# Patient Record
(demographics unavailable — no encounter records)

---

## 2025-04-07 NOTE — DISCUSSION/SUMMARY
[FreeTextEntry1] : 67-year-old -0-1-4 presents with postmenopausal bleeding.  Denies pain discharge or itching.  Physical examination shows atrophic changes and normal size uterus and clear fluid per vagina.  Need for endometrial sampling discussed at length.  Pelvic sonogram showed 1.3 cm thickened endometrium with some fibroids.  Diet and exercise reviewed.  All questions answered.  Patient to return in 2 weeks for endometrial sampling hysteroscopy. Addendum--patient prefers D&C hysteroscopy at the ambulatory center and will be scheduled.

## 2025-04-07 NOTE — HISTORY OF PRESENT ILLNESS
[N] : Patient does not use contraception [Y] : Positive pregnancy history [Menarche Age: ____] : age at menarche was [unfilled] [Menopause Age: ____] : age at menopause was [unfilled] [FreeTextEntry1] : 67-year-old -0-1-4 presents to the office with vaginal spotting since 2025.  She gives history of fibroid uterus.  She denies pain or discharge or itching.  She denies stress incontinence.  Sonogram of 2025 showed uterus in anteverted form measuring 8.8 x 5.0 x 6.4 cm.  Uterine fibroids identified measuring 3.5 x 2.7 x 2.1 cm posteriorly located intramural/subserosal fibroid in the lower uterine segment.  Second fibroid measuring 1.7 x 1.7 x 1.2 cm posteriorly located intramural submucosal fibroid in the lower uterine segment.  The third 2.0 x 2.2 x 1.7 cm anteriorly located intramural fundal fibroid.  The fourth 1 is 1.7 x 2.1 x 1.4 cm posteriorly located intramural fundal fibroid.  Heterogeneous appearing endometrium with area of cystic changes measuring 1.3 cm in maximum thickness.  The right ovary measures 2.3 x 1.2 x 2.3 cm normal in morphology the left ovary was not visualized despite a concentrated effort by the technician.  No free fluid noted. [Mammogramdate] : 03/25 [BreastSonogramDate] : 03/25 [PapSmeardate] : 03/25 [PGHxTotal] : 5 [[facility]] : 4 [PGHxPremature] : 0 [PGHxAbortions] : 1 [[facility]] : 4 [PGHxABInduced] : 0 [PGHxABSpont] : 1 [PGHxEctopic] : 0 [PGHxMultBirths] : 0

## 2025-04-07 NOTE — PHYSICAL EXAM
[Chaperoned Physical Exam] : A chaperone was present in the examining room during all aspects of the physical examination. [MA] : MA [Appropriately responsive] : appropriately responsive [Alert] : alert [No Acute Distress] : no acute distress [No Lymphadenopathy] : no lymphadenopathy [Regular Rate Rhythm] : regular rate rhythm [No Murmurs] : no murmurs [Clear to Auscultation B/L] : clear to auscultation bilaterally [Soft] : soft [Non-tender] : non-tender [Non-distended] : non-distended [No HSM] : No HSM [No Lesions] : no lesions [No Mass] : no mass [Oriented x3] : oriented x3 [Examination Of The Breasts] : a normal appearance [No Masses] : no breast masses were palpable [Vulvar Atrophy] : vulvar atrophy [Labia Majora] : normal [Labia Minora] : normal [Atrophy] : atrophy [Normal] : normal [Uterine Adnexae] : normal [Declined] : Patient declined rectal exam [FreeTextEntry2] : ramone

## 2025-05-01 NOTE — HISTORY OF PRESENT ILLNESS
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  Patient is being sent to GYN oncology for evaluation and treatment.

## 2025-05-01 NOTE — PLAN
[FreeTextEntry1] : 67-year-old with newly diagnosed endometrioid carcinoma being referred to GYN oncology for evaluation and treatment.  Return in 4 weeks for follow-up.

## 2025-05-09 NOTE — CHIEF COMPLAINT
[FreeTextEntry1] : [facility] Physician [facility] Gynecology Oncology [place] [address] [place] [address]  Endometrial cancer

## 2025-05-09 NOTE — HISTORY OF PRESENT ILLNESS
[FreeTextEntry1] : 68 yo  LMP10 years ago Referred by Dr Gilbert for endometrial cancer.   Patient reports starting in 2025 she noticed occasional smears of blood in her undergarments. She presented to GYN who performed US and subsequently a D&C. She admits to KAYLA which is bothersome to her. She denies pelvic pain, sexual activity, unintentional weight loss, change in bowel/bladder habits.   US 4/3/25 Uterus AV measuring 8.8. x 5.0 x 6.4 cm, with uterine fibroids, heterogeneous appearing thickened endometrial lining with areas of cystic change measuring 1.3 cm, differential includes endometrial hyperplasia/neoplasia, gynecological consultation and tissue sampling is recommended. Grossly unremarkable appearance of right ovary, left ovary not visualized due to excessive bowel gas in the adnexa.   Pt s/p EUA Fractional D&C with hysteroscopic resection of intrauterine polypoid masses at [facility] 25 pathology as follows;  1. Endocervix, curettings/biopsy  - Minute cluster of atypical glandular cells - Endocervical tissue and superficial squamous cells with reactive change  2. Endometrium, curettings/biopsy - Endometrioid carcinoma, FIGO grade 1  3. Endometrial polyp or mass, biopsy - Endometrioid carcinoma, FIGO grade 1   Note: Immunohistochemical stains show the tumor cells have wild type p53 expression, p16 shows patchy staining and ER is positive in tumor cells.  MMR (by IHC)- reporting Template (CAP 2013)  Immunohistochemistry Testing (IHC) for Mismatch Repair Proteins performed on tissue block 2A shows the following result:  MLH1 : Loss of nuclear expression MSH2 :   Intact nuclear expression MSH6:   Intact nuclear expression PMS2 : Loss of nuclear expression  Lpap:  WNL per pt Lmammo:  WNL per pt Lcolonoscopy: 5 years ago due now LDEXA: 2019 WNL per pt

## 2025-05-09 NOTE — DISCUSSION/SUMMARY
[FreeTextEntry1] : Discussed standard of care of endometrial cancer including RA TLH BSO SLND Cystoscopy. This disease is 99% curable and 75% curable with surgery alone, occasionally (15%) pt may require RT and <5% of the time will require chemotherapy. This will be determined at time of her final pathology review from hysterectomy. She may notice hot flushing once her ovaries are removed but there is no health risk associated with removal of bilateral ovaries after age 65.   I discussed at length with the patient the nature, purpose, risks, benefits, and alternatives of Robot assisted total laparoscopic hysterectomy with bilateral salpingo-oophorectomy,sentinel lymph node mapping.  The patient understands the risks to include, but not be limited to, bowel injury, bleeding (with the possible need for transfusion), bladder or ureteral injury, infections, deep venous thrombosis, and moises-operative death. The patient also understands that her surgery may not be able to be performed robotically and that she may need a laparotomy.  She also understands the limitations of robotic surgery and the possibility of missing a surgical complication with need for subsequent re-exploration.  She agrees to proceed.  She asked numerous questions which were answered to her satisfaction.  She understands the need for a pre-operative bowel preparation and agrees to comply with our instructions.  She also understands the rationale for a cystoscopy at the completion of the procedure and the potential risks of cystoscopy.

## 2025-05-09 NOTE — PHYSICAL EXAM
[Chaperoned Physical Exam] : A chaperone was present in the examining room during all aspects of the physical examination. [PA] : PA [Normal] : Mood and affect: Normal [FreeTextEntry2] : Maria Elena Lozoya [de-identified] : GYN DEFERRED

## 2025-05-09 NOTE — CHIEF COMPLAINT
[FreeTextEntry1] : [facility] Physician [facility] Gynecology Oncology [place] [address] [place] [address]  Endometrial cancer

## 2025-05-09 NOTE — CHIEF COMPLAINT
[FreeTextEntry1] : [facility] Physician [facility] Gynecology Oncology [place] [address] [place] [address]  Endometrial cancer

## 2025-05-09 NOTE — PHYSICAL EXAM
[Chaperoned Physical Exam] : A chaperone was present in the examining room during all aspects of the physical examination. [PA] : PA [Normal] : Mood and affect: Normal [FreeTextEntry2] : Maria Elena Lozoya [de-identified] : GYN DEFERRED

## 2025-05-09 NOTE — PHYSICAL EXAM
[Chaperoned Physical Exam] : A chaperone was present in the examining room during all aspects of the physical examination. [PA] : PA [Normal] : Mood and affect: Normal [FreeTextEntry2] : Maria Elena Lozoya [de-identified] : GYN DEFERRED

## 2025-05-09 NOTE — HISTORY OF PRESENT ILLNESS
[FreeTextEntry1] : 66 yo  LMP10 years ago Referred by Dr Gilbert for endometrial cancer.   Patient reports starting in 2025 she noticed occasional smears of blood in her undergarments. She presented to GYN who performed US and subsequently a D&C. She admits to KAYLA which is bothersome to her. She denies pelvic pain, sexual activity, unintentional weight loss, change in bowel/bladder habits.   US 4/3/25 Uterus AV measuring 8.8. x 5.0 x 6.4 cm, with uterine fibroids, heterogeneous appearing thickened endometrial lining with areas of cystic change measuring 1.3 cm, differential includes endometrial hyperplasia/neoplasia, gynecological consultation and tissue sampling is recommended. Grossly unremarkable appearance of right ovary, left ovary not visualized due to excessive bowel gas in the adnexa.   Pt s/p EUA Fractional D&C with hysteroscopic resection of intrauterine polypoid masses at [facility] 25 pathology as follows;  1. Endocervix, curettings/biopsy  - Minute cluster of atypical glandular cells - Endocervical tissue and superficial squamous cells with reactive change  2. Endometrium, curettings/biopsy - Endometrioid carcinoma, FIGO grade 1  3. Endometrial polyp or mass, biopsy - Endometrioid carcinoma, FIGO grade 1   Note: Immunohistochemical stains show the tumor cells have wild type p53 expression, p16 shows patchy staining and ER is positive in tumor cells.  MMR (by IHC)- reporting Template (CAP 2013)  Immunohistochemistry Testing (IHC) for Mismatch Repair Proteins performed on tissue block 2A shows the following result:  MLH1 : Loss of nuclear expression MSH2 :   Intact nuclear expression MSH6:   Intact nuclear expression PMS2 : Loss of nuclear expression  Lpap:  WNL per pt Lmammo:  WNL per pt Lcolonoscopy: 5 years ago due now LDEXA: 2019 WNL per pt

## 2025-05-09 NOTE — PLAN
[TextEntry] : FU Methylation from D&C pathology EUA RA TLH BSO SLND Cystoscopy @ [facility] Consent signed in office Acknowledgement of hysterectomy signed Slide release signed Medical clearance and PST with CBC, CMP, HgA1c, PTT/PT/INR, UA, T&S. Bowel prep/ERP discussed

## 2025-05-09 NOTE — END OF VISIT
[FreeTextEntry3] : I, Dr. Farhan Lobo, personally performed the evaluation and management of (E/M) services for this new patient. That E/M includes conducting the initial examination, assessing all conditions, and establishing the plan of care. Today, Maria Elena Lozoya PA-C, was here to observe my evaluation and management services for this patient to be followed going forward.

## 2025-05-09 NOTE — PLAN
[TextEntry] : FU Methylation from D&C pathology EUA RA TLH BSO SLND Cystoscopy @ [facility] Consent signed in office Acknowledgement of hysterectomy signed Slide release signed Medical clearance and PST with CBC, CMP, HgA1c, PTT/PT/INR, UA, T&S. Bowel prep/ERP discussed

## 2025-05-09 NOTE — HISTORY OF PRESENT ILLNESS
[FreeTextEntry1] : 68 yo  LMP10 years ago Referred by Dr Gilbert for endometrial cancer.   Patient reports starting in 2025 she noticed occasional smears of blood in her undergarments. She presented to GYN who performed US and subsequently a D&C. She admits to KAYLA which is bothersome to her. She denies pelvic pain, sexual activity, unintentional weight loss, change in bowel/bladder habits.   US 4/3/25 Uterus AV measuring 8.8. x 5.0 x 6.4 cm, with uterine fibroids, heterogeneous appearing thickened endometrial lining with areas of cystic change measuring 1.3 cm, differential includes endometrial hyperplasia/neoplasia, gynecological consultation and tissue sampling is recommended. Grossly unremarkable appearance of right ovary, left ovary not visualized due to excessive bowel gas in the adnexa.   Pt s/p EUA Fractional D&C with hysteroscopic resection of intrauterine polypoid masses at [facility] 25 pathology as follows;  1. Endocervix, curettings/biopsy  - Minute cluster of atypical glandular cells - Endocervical tissue and superficial squamous cells with reactive change  2. Endometrium, curettings/biopsy - Endometrioid carcinoma, FIGO grade 1  3. Endometrial polyp or mass, biopsy - Endometrioid carcinoma, FIGO grade 1   Note: Immunohistochemical stains show the tumor cells have wild type p53 expression, p16 shows patchy staining and ER is positive in tumor cells.  MMR (by IHC)- reporting Template (CAP 2013)  Immunohistochemistry Testing (IHC) for Mismatch Repair Proteins performed on tissue block 2A shows the following result:  MLH1 : Loss of nuclear expression MSH2 :   Intact nuclear expression MSH6:   Intact nuclear expression PMS2 : Loss of nuclear expression  Lpap:  WNL per pt Lmammo:  WNL per pt Lcolonoscopy: 5 years ago due now LDEXA: 2019 WNL per pt

## 2025-05-09 NOTE — PLAN
[TextEntry] : FU Methylation from D&C pathology EUA RA TLH BSO SLND Cystoscopy @ [facility] Consent signed in office Acknowledgement of hysterectomy signed Slide release signed Medical clearance and PST with CBC, CMP, HgA1c, PTT/PT/INR, UA, T&S. Bowel prep/ERP discussed

## 2025-05-20 NOTE — PROCEDURE
[Straight Catheterization] : insertion of a straight catheter [Stress Incontinence] : stress incontinence [Urgent Incontinence] : urgent incontinence [Urinary Frequency] : urinary frequency [Patient] : the patient [___ Fr Straight Tip] : a [unfilled] in [place] straight tip catheter [None] : there were no complications with the catheter insertion [Clear] : clear [No Complications] : no complications [Tolerated Well] : the patient tolerated the procedure well [Post procedure instructions and information given] : Post procedure instructions and information were given and reviewed with patient. [1] : 1 [FreeTextEntry1] : cathed to obtain pvr and uncontam specimen

## 2025-05-20 NOTE — PHYSICAL EXAM
[MA] : MA [No Acute Distress] : in no acute distress [Oriented x3] : oriented to person, place, and time [None] : no CVA tenderness [Labia Majora] : were normal [Labia Minora] : were normal [Bartholin's Gland] : both Bartholin's glands were normal  [Normal Appearance] : general appearance was normal [No Bleeding] : there was no active vaginal bleeding [2] : 2 [Aa ____] : Aa [unfilled] [Ba ____] : Ba [unfilled] [C ____] : C [unfilled] [GH ____] : GH [unfilled] [PB ____] : PB [unfilled] [TVL ____] : TVL  [unfilled] [Ap ____] : Ap [unfilled] [Bp ____] : Bp [unfilled] [D ____] : D [unfilled] [] : I [Normal] : normal [Soft] :  the cervix was soft [Post Void Residual ____ml] : post void residual was [unfilled] ml [Exam Deferred] : was deferred [FreeTextEntry2] : Leda [Tenderness] : ~T no ~M abdominal tenderness observed [Distended] : not distended [FreeTextEntry4] : no mass lesion or cyst [de-identified] : exam limited

## 2025-05-20 NOTE — OB HISTORY
[Vaginal ___] : [unfilled] vaginal delivery(s) [Definite ___ (Date)] : the last menstrual period was [unfilled] [Last Pap Smear ___] : date of last pap smear was on [unfilled] [Abnormal Pap Smear] : normal pap smear [Sexually Active] : is not sexually active [FreeTextEntry1] : largest baby 9 lbs

## 2025-05-20 NOTE — ASSESSMENT
[FreeTextEntry1] : 68 yo  P4 with CHING, urgency incontinence > KAYLA symptomatically. On exam, empty supine CST was neg but full CST was positive. Her PVR was normal. Simple CMG had been performed. POPQ did not reveal clinically significant POP.  The patient has symptoms consistent with stress urinary incontinence. The etiology of KAYLA was discussed. Management options including observation, behavioral modifications, medication, pessary, Impressa insert, periurethral bulking via cystoscopy, and surgery with midurethral sling were reviewed.   The patient has urinary symptoms consistent with overactive bladder. The etiology of OAB was discussed. Management options including observation, behavioral modifications (dietary changes, monitoring fluid intake, bladder training, timed voids, use of pads/protective garments), kegels, PT, medications, PTNS, SNS, and bladder Botox were all reviewed.  MUS intended to treat KAYLA and not OAB reviewed. She is mostly bothered by urgency and UUI and would like to defer MUS at the time of Gyn Onc surg for now. She likely will pursue office OAB management after, likely bladder botox. Weight loss could help as well, which was discussed. She does not have POP that would warrant potential Urogyn surg intervention at the time of Gyn Onc surg. All ques answered.

## 2025-05-20 NOTE — HISTORY OF PRESENT ILLNESS
[FreeTextEntry1] : Patient is scheduled to undergo RA TLH BSO SLND Cystoscopy with Dr. Lobo for endometrial cancer. Leakage of urine is bothersome with sneeze and cough, also with urgency. No dysuria, no UTIs, no incomplete emptying, no gross hematuria. No bulge or pressure from the vagina, no protrusion of tissue. Occasional constipation, no FI.   POB  PMH BMI 36, renal disease, HTN, DM, OA, thyroid disease, endometrial cancer PSH bunionectomy, D&C NKDA Nonsmoker Works as a nurse practitioner  TVUS 2025 8.8. x 5.0 x 6.4 cm, with uterine fibroids, heterogeneous appearing thickened endometrial lining with areas of cystic change measuring 1.3 cm, grossly unremarkable R ov, L ov not visualized. EmBx 2025 endometrioid carcinoma, FIGO grade 1 Pap per patient 2022 WNL Last colonoscopy about 5 years prior

## 2025-05-20 NOTE — PROCEDURE
[Straight Catheterization] : insertion of a straight catheter [Stress Incontinence] : stress incontinence [Urgent Incontinence] : urgent incontinence [Urinary Frequency] : urinary frequency [Patient] : the patient [___ Fr Straight Tip] : a [unfilled] in [place] straight tip catheter [None] : there were no complications with the catheter insertion [Clear] : clear [No Complications] : no complications [Tolerated Well] : the patient tolerated the procedure well [Post procedure instructions and information given] : Post procedure instructions and information were given and reviewed with patient. [1] : 1 [FreeTextEntry1] : cathed to obtain pvr and uncontam specimen

## 2025-05-20 NOTE — PHYSICAL EXAM
[MA] : MA [No Acute Distress] : in no acute distress [Oriented x3] : oriented to person, place, and time [None] : no CVA tenderness [Labia Majora] : were normal [Labia Minora] : were normal [Bartholin's Gland] : both Bartholin's glands were normal  [Normal Appearance] : general appearance was normal [No Bleeding] : there was no active vaginal bleeding [2] : 2 [Aa ____] : Aa [unfilled] [Ba ____] : Ba [unfilled] [C ____] : C [unfilled] [GH ____] : GH [unfilled] [PB ____] : PB [unfilled] [TVL ____] : TVL  [unfilled] [Ap ____] : Ap [unfilled] [Bp ____] : Bp [unfilled] [D ____] : D [unfilled] [] : I [Normal] : normal [Soft] :  the cervix was soft [Post Void Residual ____ml] : post void residual was [unfilled] ml [Exam Deferred] : was deferred [FreeTextEntry2] : Leda [Tenderness] : ~T no ~M abdominal tenderness observed [Distended] : not distended [FreeTextEntry4] : no mass lesion or cyst [de-identified] : exam limited

## 2025-05-20 NOTE — ASSESSMENT
[FreeTextEntry1] : 66 yo  P4 with CHING, urgency incontinence > KAYLA symptomatically. On exam, empty supine CST was neg but full CST was positive. Her PVR was normal. Simple CMG had been performed. POPQ did not reveal clinically significant POP.  The patient has symptoms consistent with stress urinary incontinence. The etiology of KAYLA was discussed. Management options including observation, behavioral modifications, medication, pessary, Impressa insert, periurethral bulking via cystoscopy, and surgery with midurethral sling were reviewed.   The patient has urinary symptoms consistent with overactive bladder. The etiology of OAB was discussed. Management options including observation, behavioral modifications (dietary changes, monitoring fluid intake, bladder training, timed voids, use of pads/protective garments), kegels, PT, medications, PTNS, SNS, and bladder Botox were all reviewed.  MUS intended to treat KAYLA and not OAB reviewed. She is mostly bothered by urgency and UUI and would like to defer MUS at the time of Gyn Onc surg for now. She likely will pursue office OAB management after, likely bladder botox. Weight loss could help as well, which was discussed. She does not have POP that would warrant potential Urogyn surg intervention at the time of Gyn Onc surg. All ques answered.

## 2025-05-20 NOTE — PROCEDURE
[Straight Catheterization] : insertion of a straight catheter [Stress Incontinence] : stress incontinence [Urgent Incontinence] : urgent incontinence [Urinary Frequency] : urinary frequency [Patient] : the patient [___ Fr Straight Tip] : a [unfilled] in [place] straight tip catheter [None] : there were no complications with the catheter insertion [Clear] : clear [No Complications] : no complications [Tolerated Well] : the patient tolerated the procedure well [Post procedure instructions and information given] : Post procedure instructions and information were given and reviewed with patient. [1] : 1 [FreeTextEntry1] : cathed to obtain pvr and uncontam specimen

## 2025-05-20 NOTE — REASON FOR VISIT
[Questionnaire Received] : Patient questionnaire received [Urinary Incontinence] : urinary incontinence [Urine Frequency] : urine frequency [Urinary Urgency] : urinary urgency [Nocturia] : nocturia [FreeTextEntry2] : scheduling surgical staging for endometrial cancer

## 2025-05-20 NOTE — PROCEDURE
[Straight Catheterization] : insertion of a straight catheter [Stress Incontinence] : stress incontinence [Urgent Incontinence] : urgent incontinence [Urinary Frequency] : urinary frequency [Patient] : the patient [___ Fr Straight Tip] : a [unfilled] in [place] straight tip catheter [None] : there were no complications with the catheter insertion [Clear] : clear [No Complications] : no complications [Tolerated Well] : the patient tolerated the procedure well [Post procedure instructions and information given] : Post procedure instructions and information were given and reviewed with patient. [1] : 1 [FreeTextEntry1] : cathed to obtain pvr and uncontam specimen

## 2025-05-20 NOTE — PHYSICAL EXAM
[MA] : MA [No Acute Distress] : in no acute distress [Oriented x3] : oriented to person, place, and time [None] : no CVA tenderness [Labia Majora] : were normal [Labia Minora] : were normal [Bartholin's Gland] : both Bartholin's glands were normal  [Normal Appearance] : general appearance was normal [No Bleeding] : there was no active vaginal bleeding [2] : 2 [Aa ____] : Aa [unfilled] [Ba ____] : Ba [unfilled] [C ____] : C [unfilled] [GH ____] : GH [unfilled] [PB ____] : PB [unfilled] [TVL ____] : TVL  [unfilled] [Ap ____] : Ap [unfilled] [Bp ____] : Bp [unfilled] [D ____] : D [unfilled] [] : I [Normal] : normal [Soft] :  the cervix was soft [Post Void Residual ____ml] : post void residual was [unfilled] ml [Exam Deferred] : was deferred [FreeTextEntry2] : Leda [Tenderness] : ~T no ~M abdominal tenderness observed [Distended] : not distended [FreeTextEntry4] : no mass lesion or cyst [de-identified] : exam limited

## 2025-05-20 NOTE — PHYSICAL EXAM
[MA] : MA [No Acute Distress] : in no acute distress [Oriented x3] : oriented to person, place, and time [None] : no CVA tenderness [Labia Majora] : were normal [Labia Minora] : were normal [Bartholin's Gland] : both Bartholin's glands were normal  [Normal Appearance] : general appearance was normal [No Bleeding] : there was no active vaginal bleeding [2] : 2 [Aa ____] : Aa [unfilled] [Ba ____] : Ba [unfilled] [C ____] : C [unfilled] [GH ____] : GH [unfilled] [PB ____] : PB [unfilled] [TVL ____] : TVL  [unfilled] [Ap ____] : Ap [unfilled] [Bp ____] : Bp [unfilled] [D ____] : D [unfilled] [] : I [Normal] : normal [Soft] :  the cervix was soft [Post Void Residual ____ml] : post void residual was [unfilled] ml [Exam Deferred] : was deferred [FreeTextEntry2] : Leda [Tenderness] : ~T no ~M abdominal tenderness observed [Distended] : not distended [FreeTextEntry4] : no mass lesion or cyst [de-identified] : exam limited

## 2025-05-30 NOTE — HISTORY OF PRESENT ILLNESS
[Home] : at home, [unfilled] , at the time of the visit. [Other Location: e.g. Home (Enter Location, City,State)___] : at [unfilled] [Telephone (audio)] : This telephonic visit was provided via audio only technology. [Verbal consent obtained from patient] : the patient, [unfilled] [FreeTextEntry1] : 66 yo  LMP10 years ago Referred by Dr Gilbert for endometrial cancer with plan for robotic hysterectomy, bilateral salpingo-oophorectomy, sentinel lymph node dissection, cystoscopy. She presents to discuss consultation with urogynecology. She has decided to avoid surgical management for now and focus on other options. She has mixed incontinence and only portion of the problem would be resolved.   Indication: transportation issue

## 2025-05-30 NOTE — END OF VISIT
[Time Spent: ___ minutes] : I have spent [unfilled] minutes of time on the encounter which excludes teaching and separately reported services. [FreeTextEntry3] : Proceed with surgery

## 2025-05-30 NOTE — ASSESSMENT
[FreeTextEntry1] : 68 yo  LMP10 years ago Referred by Dr Gilbert for endometrial cancer with plan for robotic hysterectomy, bilateral salpingo-oophorectomy, sentinel lymph node dissection, cystoscopy. She presents to discuss consultation with urogynecology. She has decided to avoid surgical management for now and focus on other options. She has mixed incontinence and only portion of the problem would be resolved.   She has surgery scheduled for  and we will proceed with surgery without urogynecology.

## 2025-07-02 NOTE — REASON FOR VISIT
[Post Op] : post op visit [de-identified] : 6/11/25 [de-identified] : RA TLH BSO SLND Uterosacral ligament fixation cystoscopy  [de-identified] : Patient has recovered well from her surgery, Denies any SOB, abnormal pain or VB. Bowel and bladder function are wnl. Patient states she feels well from a gynecological stand point.

## 2025-07-02 NOTE — DISCUSSION/SUMMARY
[Clean] : was clean [Dry] : was dry [Intact] : was intact [Erythema] : was not erythematous [Ecchymosis] : was not ecchymotic [Seroma] : had no seroma [None] : had no drainage [Normal Skin] : normal appearance [Firm] : soft [Tender] : nontender [Abnormal Bowel Sounds] : normal bowel sounds [Rebound] : no rebound tenderness [Guarding] : no guarding [Incisional Hernia] : no incisional hernia [Mass] : no palpable mass [Doing Well] : is doing well [Excellent Pain Control] : has excellent pain control [No Sign of Infection] : is showing no signs of infection [FreeTextEntry1] : Pertinent Findings:  Normal external genitalia, normal cervix, uterus sounded to 8 cm, normal bilateral fallopian tubes and ovaries. No pelvic or abdominal adhesive disease. Intracervical injection of ICG dye. No pathologic lymph nodes, mapping to bilateral external iliac and right common iliac sentinel lymph nodes. No concern for metastatic disease on inspection in the pelvis or abdomen. Cystoscopy: normal bilateral ureteral jets, no injury to the bladder, and no bladder masses or lesions.   Final Diagnosis  1.  UTERUS, CERVIX, AND BILATERAL ADNEXA (JEIMY-BSO, 250 G): -   UTERUS: -   ENDOMETRIOID ADENOCARCINOMA, FIGO GRADE 1, (NUCLEAR GRADE 2) WITH EXTENSIVE SQUAMOUS DIFFERENTIATION AND FOCAL MUCINOUS DIFFERENTIATION (2.4 CM). -   NO DEFINITIVE LYMPHOVASCULAR INVASION. -   TUMOR INVADES THE INNER PORTION OF MYOMETRIUM (0.3 CM / 1.5 CM). -   TUMOR ARISES IN ASSOCIATION WITH ENDOMETRIAL HYPERPLASIA WITH ATYPIA. -   FOCI OF ADENOMYOSIS WITH FOCAL TUMOR INVOLVEMENT. -   Leiomyomata, multiple, some hyalinized.  -   Cervix: -   Negative for malignancy. -   Chronic cervicitis, focally moderate to marked, with erosions and hemorrhage.  -   Ovaries, bilateral: -   Negative for malignancy. -   A few small fibrous paraovarian adhesions.  -   Fallopian tubes, bilateral: -   Negative for malignancy. -   No significant histologic abnormality.  2.  Lymph node (right external iliac sentinel node): -   Negative for malignancy (0/1). -   Lack of involvement by tumor confirmed with negative immunohistochemical staining for cytokeratin.  3.  Lymph node (right common iliac sentinel node): -   Negative for malignancy (0/1). -   Lack of involvement by tumor confirmed with negative immunohistochemical staining for cytokeratin.  4.  Lymph node (left external iliac sentinel node): -   Negative for malignancy (0/1). -   Lack of involvement by tumor confirmed with negative immunohistochemical staining for cytokeratin.  5.  Vaginal tissue (anterior vaginal margin): -   Negative for malignancy. -   Patchy submucosal congestion and focal mild chronic inflammation.

## 2025-07-02 NOTE — END OF VISIT
[FreeTextEntry3] : RTC in 4 weeks for cuff check [FreeTextEntry2] : Maria Luisa See MA was present the entire duration of the patient interaction and gynecological exam.

## 2025-07-02 NOTE — REASON FOR VISIT
[Post Op] : post op visit [de-identified] : 6/11/25 [de-identified] : RA TLH BSO SLND Uterosacral ligament fixation cystoscopy  [de-identified] : Patient has recovered well from her surgery, Denies any SOB, abnormal pain or VB. Bowel and bladder function are wnl. Patient states she feels well from a gynecological stand point.

## 2025-07-02 NOTE — ASSESSMENT
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 No LVI, negative lymph nodes. No adjuvant treatment recommended. Follow up for cuff exam. Recovering well.